# Patient Record
Sex: MALE | Race: WHITE | NOT HISPANIC OR LATINO | Employment: STUDENT | URBAN - METROPOLITAN AREA
[De-identification: names, ages, dates, MRNs, and addresses within clinical notes are randomized per-mention and may not be internally consistent; named-entity substitution may affect disease eponyms.]

---

## 2024-05-08 ENCOUNTER — OFFICE VISIT (OUTPATIENT)
Dept: FAMILY MEDICINE | Facility: CLINIC | Age: 19
End: 2024-05-08
Payer: COMMERCIAL

## 2024-05-08 VITALS
BODY MASS INDEX: 18.88 KG/M2 | SYSTOLIC BLOOD PRESSURE: 134 MMHG | DIASTOLIC BLOOD PRESSURE: 81 MMHG | TEMPERATURE: 97.6 F | HEART RATE: 66 BPM | WEIGHT: 139.4 LBS | OXYGEN SATURATION: 99 % | HEIGHT: 72 IN | RESPIRATION RATE: 16 BRPM

## 2024-05-08 DIAGNOSIS — N50.89 TESTICULAR MASS: Primary | ICD-10-CM

## 2024-05-08 PROCEDURE — 99203 OFFICE O/P NEW LOW 30 MIN: CPT

## 2024-05-08 NOTE — PROGRESS NOTES
"  Assessment & Plan     Testicular mass  - US Testicular & Scrotum w [employer] [facility]      Follow up pending US results or if symptoms worsen.       Fern Davalos is a 19 year old, presenting for the following health issues:  Testicular Problem (Noticed a lump last night )      Pt reports noticing a lump to L testicle last night. Never noticed before. Not painful, unsure if mobile. Was unable to relocate this AM.     Denies- edema, erythema, wt loss, night sweats, pain on urination, known injury         5/8/2024     3:29 PM   Additional Questions   Roomed by lisa     History of Present Illness       Reason for visit:  General checkup/testicular lump    He eats 0-1 servings of fruits and vegetables daily.He consumes 3 sweetened beverage(s) daily.He exercises with enough effort to increase his heart rate 20 to 29 minutes per day.    He is taking medications regularly.     Review of Systems  Constitutional, HEENT, cardiovascular, pulmonary, gi and gu systems are negative, except as otherwise noted.      Objective    /81   Pulse 66   Temp 97.6  F (36.4  C) (Oral)   Resp 16   Ht 1.82 m (5' 11.65\")   Wt 63.2 kg (139 lb 6.4 oz)   SpO2 99%   BMI 19.09 kg/m    Body mass index is 19.09 kg/m .  Physical Exam   GENERAL: healthy, alert and no distress  EYES: Eyes grossly normal to inspection, PERRL and conjunctivae and sclerae normal  Neck: No visible JVD or lymphadenopathy   RESP: symmetrical rise in chest   CV: No peripheral edema notable   MS: no gross musculoskeletal defects noted  SKIN: no suspicious lesions or rashes  : No mass palpable to testes bilat, no edema/erythema/tenderness to scrotum   PSYCH: mentation appears normal, affect normal/bright        Signed Electronically by: MAGY Bernal CNP  "

## 2024-06-30 ENCOUNTER — HEALTH MAINTENANCE LETTER (OUTPATIENT)
Age: 19
End: 2024-06-30

## 2025-07-13 ENCOUNTER — HEALTH MAINTENANCE LETTER (OUTPATIENT)
Age: 20
End: 2025-07-13